# Patient Record
Sex: FEMALE | Race: WHITE | HISPANIC OR LATINO | Employment: FULL TIME | URBAN - METROPOLITAN AREA
[De-identification: names, ages, dates, MRNs, and addresses within clinical notes are randomized per-mention and may not be internally consistent; named-entity substitution may affect disease eponyms.]

---

## 2023-05-18 PROBLEM — H11.33 SUBCONJUNCTIVAL HEMORRHAGE OF BOTH EYES: Status: ACTIVE | Noted: 2023-05-18

## 2023-05-18 PROBLEM — C34.90 MALIGNANT NEOPLASM OF LUNG (MULTI): Status: ACTIVE | Noted: 2023-05-18

## 2023-05-18 PROBLEM — E55.9 VITAMIN D DEFICIENCY: Status: ACTIVE | Noted: 2023-05-18

## 2023-05-18 PROBLEM — H00.011 HORDEOLUM EXTERNUM OF RIGHT UPPER EYELID: Status: ACTIVE | Noted: 2023-05-18

## 2023-05-18 PROBLEM — J38.3 VOCAL FOLD SCAR: Status: ACTIVE | Noted: 2023-05-18

## 2023-05-18 PROBLEM — R68.89 FLU-LIKE SYMPTOMS: Status: ACTIVE | Noted: 2023-05-18

## 2023-05-18 PROBLEM — K21.9 GERD (GASTROESOPHAGEAL REFLUX DISEASE): Status: ACTIVE | Noted: 2023-05-18

## 2023-05-18 PROBLEM — R53.83 FATIGUE: Status: ACTIVE | Noted: 2023-05-18

## 2023-05-18 PROBLEM — R49.0 VOICE HOARSENESS: Status: ACTIVE | Noted: 2023-05-18

## 2023-05-18 PROBLEM — D14.1 PAPILLOMA OF LARYNX: Status: ACTIVE | Noted: 2023-05-18

## 2023-05-18 PROBLEM — B97.7 HPV (HUMAN PAPILLOMA VIRUS) INFECTION: Status: ACTIVE | Noted: 2023-05-18

## 2023-05-18 PROBLEM — H10.9 CONJUNCTIVITIS, BACTERIAL: Status: ACTIVE | Noted: 2023-05-18

## 2023-05-22 ENCOUNTER — OFFICE VISIT (OUTPATIENT)
Dept: PRIMARY CARE | Facility: CLINIC | Age: 28
End: 2023-05-22
Payer: COMMERCIAL

## 2023-05-22 VITALS
WEIGHT: 142 LBS | OXYGEN SATURATION: 96 % | HEART RATE: 73 BPM | SYSTOLIC BLOOD PRESSURE: 106 MMHG | BODY MASS INDEX: 24.24 KG/M2 | HEIGHT: 64 IN | DIASTOLIC BLOOD PRESSURE: 72 MMHG | TEMPERATURE: 96.7 F

## 2023-05-22 DIAGNOSIS — R22.31 NODULE OF FINGER, RIGHT: ICD-10-CM

## 2023-05-22 DIAGNOSIS — D14.2 PAPILLOMATOSIS OF TRACHEA: ICD-10-CM

## 2023-05-22 DIAGNOSIS — Z00.00 ROUTINE HEALTH MAINTENANCE: ICD-10-CM

## 2023-05-22 DIAGNOSIS — Z00.00 HEALTHCARE MAINTENANCE: ICD-10-CM

## 2023-05-22 DIAGNOSIS — Z86.69 HISTORY OF HORDEOLUM: ICD-10-CM

## 2023-05-22 DIAGNOSIS — C34.90 SQUAMOUS CELL CARCINOMA OF LUNG, UNSPECIFIED LATERALITY (MULTI): Primary | ICD-10-CM

## 2023-05-22 DIAGNOSIS — Z13.89 SCREENING FOR MULTIPLE CONDITIONS: ICD-10-CM

## 2023-05-22 PROBLEM — R49.0 DYSPHONIA: Status: ACTIVE | Noted: 2017-10-19

## 2023-05-22 PROBLEM — R53.81 MALAISE: Status: ACTIVE | Noted: 2017-03-19

## 2023-05-22 PROCEDURE — G0442 ANNUAL ALCOHOL SCREEN 15 MIN: HCPCS | Performed by: STUDENT IN AN ORGANIZED HEALTH CARE EDUCATION/TRAINING PROGRAM

## 2023-05-22 PROCEDURE — 1036F TOBACCO NON-USER: CPT | Performed by: STUDENT IN AN ORGANIZED HEALTH CARE EDUCATION/TRAINING PROGRAM

## 2023-05-22 PROCEDURE — 99395 PREV VISIT EST AGE 18-39: CPT | Performed by: STUDENT IN AN ORGANIZED HEALTH CARE EDUCATION/TRAINING PROGRAM

## 2023-05-22 PROCEDURE — 96127 BRIEF EMOTIONAL/BEHAV ASSMT: CPT | Performed by: STUDENT IN AN ORGANIZED HEALTH CARE EDUCATION/TRAINING PROGRAM

## 2023-05-22 ASSESSMENT — ENCOUNTER SYMPTOMS
NAUSEA: 0
UNEXPECTED WEIGHT CHANGE: 0
LIGHT-HEADEDNESS: 0
DIAPHORESIS: 0
VOMITING: 0
FEVER: 0
CHILLS: 0
DIZZINESS: 0
SHORTNESS OF BREATH: 0
DYSURIA: 0

## 2023-05-22 ASSESSMENT — PATIENT HEALTH QUESTIONNAIRE - PHQ9
1. LITTLE INTEREST OR PLEASURE IN DOING THINGS: NOT AT ALL
2. FEELING DOWN, DEPRESSED OR HOPELESS: NOT AT ALL
SUM OF ALL RESPONSES TO PHQ9 QUESTIONS 1 AND 2: 0

## 2023-05-22 NOTE — PROGRESS NOTES
"Subjective   Patient ID: Polina Porter is a 28 y.o. female who presents for Annual Exam.  She is here for CPE.    She has a bump on her right index finger. Started a few weeks ago, about the same size and hasn't changed. No draingage and not painful.  Hasn't had this before. Hasn't seen dermatology before.    Requesting referral to thoracic surgery to re establish care.    Reports getting recurrent styes. First getting styes about 2 years ago and gets at least 2-3 styes every winter. Doesn't wear makeup often. Doesn't wear contacts. Lasts 1-2 weeks and goes away.     OB/GYN: hasn't seen regularly.    Social History: Works at [employer] and at home with  and 3 kids, feels safe at home. Currently breastfeeding.           Review of Systems   Constitutional:  Negative for chills, diaphoresis, fever and unexpected weight change.   HENT:  Negative for hearing loss.    Eyes:  Negative for visual disturbance.   Respiratory:  Negative for shortness of breath.    Cardiovascular:  Negative for chest pain.   Gastrointestinal:  Negative for nausea and vomiting.   Genitourinary:  Negative for dysuria.   Skin:  Negative for rash.   Neurological:  Negative for dizziness and light-headedness.       /72   Pulse 73   Temp 35.9 °C (96.7 °F)   Ht 1.613 m (5' 3.5\")   Wt 64.4 kg (142 lb)   SpO2 96%   BMI 24.76 kg/m²     Objective   Physical Exam  Vitals reviewed.   HENT:      Head: Normocephalic.      Right Ear: Tympanic membrane, ear canal and external ear normal. There is no impacted cerumen.      Left Ear: Tympanic membrane, ear canal and external ear normal. There is no impacted cerumen.      Mouth/Throat:      Mouth: Mucous membranes are moist.      Pharynx: Oropharynx is clear. No oropharyngeal exudate or posterior oropharyngeal erythema.   Cardiovascular:      Rate and Rhythm: Normal rate and regular rhythm.   Pulmonary:      Effort: Pulmonary effort is normal. No respiratory distress.      Breath sounds: Normal " breath sounds.   Abdominal:      General: Bowel sounds are normal. There is no distension.      Palpations: Abdomen is soft.      Tenderness: There is no abdominal tenderness. There is no guarding or rebound.   Musculoskeletal:         General: No deformity.      Cervical back: Neck supple. No tenderness.      Comments: Right index interphalangeal thickening about 2mm in diameter and 0.7cm long   Lymphadenopathy:      Cervical: No cervical adenopathy.   Skin:     Coloration: Skin is not jaundiced.   Neurological:      Mental Status: She is alert.   Psychiatric:         Mood and Affect: Mood normal.         Behavior: Behavior normal.         Assessment/Plan   Problem List Items Addressed This Visit          Respiratory    Papillomatosis of trachea     Continue f/u with ENT         Squamous cell lung cancer (CMS/HCC) - Primary     Advised to reach out to Dr. Fuchs to re establish care and help determine frequency of chest imaging- last saw 2020. Referral placed in case needed.         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 Referred to ortho hand to establish care         Relevant Orders    Referral to Orthopaedic Surgery       Other    Routine health maintenance    Relevant Orders    CBC    Comprehensive Metabolic Panel    Lipid Panel    TSH with reflex to Free T4 if abnormal    Follow Up In [facility] - PCP    Healthcare maintenance     Health Maintenance  -Pap smear: Up to date. Referred to gyn to re establish care.  -Vaccinations: Recommended covid bivalent booster, UTD TDAP, recommended flu vax after labor day  -Mammogram: Age 40  -Colonoscopy: Age 47  -Lung Cancer Screening: Not indicated  -DEXA: Age 67    CPE completed.  Advised to keep a heart healthy, low fat  diet with fruits and veggies like Mediterranean diet.  Advised on the importance of exercise and maintaining 150 minutes of exercise  per week (30 minutes per day 5 days a week).  Advised on regular eye and dental visits.  Discussed age appropriate cancer screening, immunizations and recommendations given.  Discussed avoiding illicit drugs and tobacco. Advised on appropriate use of alcohol.  Advised to wear seat belt.  Routine labs ordered  F/u 1 year for CPE or sooner PRN         Relevant Orders    Referral to Gynecology    History of hordeolum     Advised to reach out to eye doctor regarding chronic styes. Can refer to optho if needed. Advised good face hygiene and minimal makeup in interim.          Other Visit Diagnoses       Screening for multiple conditions [Z13.89 (ICD-10-CM)]

## 2023-05-23 NOTE — ASSESSMENT & PLAN NOTE
Advised to reach out to Dr. Fuchs to re establish care and help determine frequency of chest imaging- last saw 2020. Referral placed in case needed.

## 2023-05-23 NOTE — ASSESSMENT & PLAN NOTE
Advised to reach out to eye doctor regarding chronic styes. Can refer to optho if needed. Advised good face hygiene and minimal makeup in interim.

## 2023-05-23 NOTE — ASSESSMENT & PLAN NOTE
Health Maintenance  -Pap smear: Up to date. Referred to gyn to re establish care.  -Vaccinations: Recommended covid bivalent booster, UTD TDAP, recommended flu vax after labor day  -Mammogram: Age 42  -Colonoscopy: Age 47  -Lung Cancer Screening: Not indicated  -DEXA: Age 67    CPE completed.  Advised to keep a heart healthy, low fat  diet with fruits and veggies like Mediterranean diet.  Advised on the importance of exercise and maintaining 150 minutes of exercise per week (30 minutes per day 5 days a week).  Advised on regular eye and dental visits.  Discussed age appropriate cancer screening, immunizations and recommendations given.  Discussed avoiding illicit drugs and tobacco. Advised on appropriate use of alcohol.  Advised to wear seat belt.  Routine labs ordered  F/u 1 year for CPE or sooner PRN

## 2023-05-27 ENCOUNTER — LAB (OUTPATIENT)
Dept: LAB | Facility: LAB | Age: 28
End: 2023-05-27
Payer: COMMERCIAL

## 2023-05-27 DIAGNOSIS — Z00.00 ROUTINE HEALTH MAINTENANCE: ICD-10-CM

## 2023-05-27 LAB
ALANINE AMINOTRANSFERASE (SGPT) (U/L) IN SER/PLAS: 14 U/L (ref 7–45)
ALBUMIN (G/DL) IN SER/PLAS: 4.7 G/DL (ref 3.4–5)
ALKALINE PHOSPHATASE (U/L) IN SER/PLAS: 72 U/L (ref 33–110)
ANION GAP IN SER/PLAS: 12 MMOL/L (ref 10–20)
ASPARTATE AMINOTRANSFERASE (SGOT) (U/L) IN SER/PLAS: 14 U/L (ref 9–39)
BILIRUBIN TOTAL (MG/DL) IN SER/PLAS: 1 MG/DL (ref 0–1.2)
CALCIUM (MG/DL) IN SER/PLAS: 9.6 MG/DL (ref 8.6–10.3)
CARBON DIOXIDE, TOTAL (MMOL/L) IN SER/PLAS: 30 MMOL/L (ref 21–32)
CHLORIDE (MMOL/L) IN SER/PLAS: 104 MMOL/L (ref 98–107)
CHOLESTEROL (MG/DL) IN SER/PLAS: 135 MG/DL (ref 0–199)
CHOLESTEROL IN HDL (MG/DL) IN SER/PLAS: 54 MG/DL
CHOLESTEROL/HDL RATIO: 2.5
CREATININE (MG/DL) IN SER/PLAS: 0.66 MG/DL (ref 0.5–1.05)
ERYTHROCYTE DISTRIBUTION WIDTH (RATIO) BY AUTOMATED COUNT: 12.5 % (ref 11.5–14.5)
ERYTHROCYTE MEAN CORPUSCULAR HEMOGLOBIN CONCENTRATION (G/DL) BY AUTOMATED: 33.1 G/DL (ref 32–36)
ERYTHROCYTE MEAN CORPUSCULAR VOLUME (FL) BY AUTOMATED COUNT: 90 FL (ref 80–100)
ERYTHROCYTES (10*6/UL) IN BLOOD BY AUTOMATED COUNT: 4.72 X10E12/L (ref 4–5.2)
GFR FEMALE: >90 ML/MIN/1.73M2
GLUCOSE (MG/DL) IN SER/PLAS: 88 MG/DL (ref 74–99)
HEMATOCRIT (%) IN BLOOD BY AUTOMATED COUNT: 42.3 % (ref 36–46)
HEMOGLOBIN (G/DL) IN BLOOD: 14 G/DL (ref 12–16)
LDL: 64 MG/DL (ref 0–99)
LEUKOCYTES (10*3/UL) IN BLOOD BY AUTOMATED COUNT: 5 X10E9/L (ref 4.4–11.3)
PLATELETS (10*3/UL) IN BLOOD AUTOMATED COUNT: 204 X10E9/L (ref 150–450)
POTASSIUM (MMOL/L) IN SER/PLAS: 4.3 MMOL/L (ref 3.5–5.3)
PROTEIN TOTAL: 7 G/DL (ref 6.4–8.2)
SODIUM (MMOL/L) IN SER/PLAS: 142 MMOL/L (ref 136–145)
THYROTROPIN (MIU/L) IN SER/PLAS BY DETECTION LIMIT <= 0.05 MIU/L: 0.94 MIU/L (ref 0.44–3.98)
TRIGLYCERIDE (MG/DL) IN SER/PLAS: 83 MG/DL (ref 0–149)
UREA NITROGEN (MG/DL) IN SER/PLAS: 16 MG/DL (ref 6–23)
VLDL: 17 MG/DL (ref 0–40)

## 2023-05-27 PROCEDURE — 80053 COMPREHEN METABOLIC PANEL: CPT

## 2023-05-27 PROCEDURE — 36415 COLL VENOUS BLD VENIPUNCTURE: CPT

## 2023-05-27 PROCEDURE — 84443 ASSAY THYROID STIM HORMONE: CPT

## 2023-05-27 PROCEDURE — 80061 LIPID PANEL: CPT

## 2023-05-27 PROCEDURE — 85027 COMPLETE CBC AUTOMATED: CPT

## 2023-08-11 ENCOUNTER — HOSPITAL ENCOUNTER (OUTPATIENT)
Dept: DATA CONVERSION | Facility: HOSPITAL | Age: 28
End: 2023-08-11
Attending: OTOLARYNGOLOGY | Admitting: OTOLARYNGOLOGY
Payer: COMMERCIAL

## 2023-08-11 DIAGNOSIS — R49.9 UNSPECIFIED VOICE AND RESONANCE DISORDER: ICD-10-CM

## 2023-08-11 DIAGNOSIS — J38.3 OTHER DISEASES OF VOCAL CORDS: ICD-10-CM

## 2023-08-11 DIAGNOSIS — D14.1 BENIGN NEOPLASM OF LARYNX: ICD-10-CM

## 2023-09-29 VITALS — BODY MASS INDEX: 25.52 KG/M2 | WEIGHT: 138.67 LBS | HEIGHT: 62 IN

## 2023-09-30 NOTE — H&P
History of Present Illness:   Pregnant/Lactating:  ·  Are You Pregnant no   ·  Are You Currently Breastfeeding no     History Present Illness:  Reason for surgery: recurrent papillomatosis   HPI:    This is a 28 year old female with a history of recurrent respiratory papillomas     Allergies:        Allergies:  ·  No Known Allergies :     Home Medication Review:   Home Medications Reviewed: yes     Impression/Procedure:   ·  Impression and Planned Procedure: MDL, bronch, KTP laser, possible biopsy, possible avastin injection       ERAS (Enhanced Recovery After Surgery):  ·  ERAS Patient: no       Physical Exam by System:    Respiratory/Thorax: chest rise symmetric   Cardiovascular: RRR     Consent:   COVID-19 Consent:  ·  COVID-19 Risk Consent Surgeon has reviewed key risks related to the risk of gissel COVID-19 and if they contract COVID-19 what the risks are.     Attestation:   Note Completion:  I am a:  Resident/Fellow   Attending Attestation I saw and evaluated the patient.  I personally obtained the key and critical portions of the history and physical exam or was physically present for key and  critical portions performed by the resident/fellow. I reviewed the resident/fellow?s documentation and discussed the patient with the resident/fellow.  I agree with the resident/fellow?s medical decision making as documented in the note.     I personally evaluated the patient on 11-Aug-2023   Comments/ Additional Findings    Patient and I discussed the risks, benefits and alternatives to surgery and all questions answered.  Cleared to OR.          Electronic Signatures:  Dakota Amos)  (Signed 11-Aug-2023 06:50)   Authored: Note Completion   Co-Signer: History of Present Illness, Allergies, Home Medication Review, Impression/Procedure, ERAS, Physical Exam, Consent, Note Completion  Alexandrea Mccann (Resident))  (Signed 11-Aug-2023 05:45)   Authored: History of Present Illness, Allergies, Home   Medication Review, Impression/Procedure, ERAS, Physical Exam, Consent, Note Completion      Last Updated: 11-Aug-2023 06:50 by Dakota Amos)

## 2023-10-01 NOTE — OP NOTE
PROCEDURE DETAILS    Preoperative Diagnosis:  1. Recurrent respiratory papillomatosis  2. Voice change  Postoperative Diagnosis:  1. Recurrent respiratory papillomatosis  2. Voice change  Surgeon: MD Dandre  Resident/Fellow/Other Assistant: MD Ramy    Procedure:  1. Microdirect laryngoscopy, diagnostic  2. Rigid bronchoscopy  3. Microdirect laryngoscopy with KTP laser of supraglottis, glottis, subglottis and trachea  4. Microdirect laryngoscopy with steroid injection  Anesthesia: GETA - 5-0 laser ETT  Estimated Blood Loss: 1cc  Findings: Papillomas of the right TVC, ventricle, false cord, and subglottis/trachea (posterior and right)  Specimens(s) Collected: no,           Operative Report:   INDICATIONS:    This is a 28 year old female with a history of recurrent respiratory papillomatosis who has undergone numerous procedures (>100) in the past. She returned with recurrent hoarseness and was found to have papilloma burden along the vocal cords.     We discussed the risks and benefits to include, but not be limited to, bleeding; infection; damage to surrounding structures including the teeth, gums, lips, tongue; laser fire/burn; taste change; medical complications; and risks of anesthesia.  Their  questions are answered and they sign written consent.    DESCRIPTION OF PROCEDURE:    The patient was taken to the operating room and placed in the supine position on the operating room table.  A time out procedure was performed confirming patient and site. Following satisfactory induction of general anesthesia, The patient was intubated  with a 5.0 laser endotracheal tube by anesthesia and turned 90 degrees away from anesthesia.     The teeth were covered with a moldable mouth guard. The dedo laryngoscope was inserted and laryngoscopy was performed revealing normal  tissue without papilloma of all subsites of the oral cavity, oropharynx and hypopharynx. We then suspended using the  lewy arm exposing the  glottis. Using a 30 degree bronchoscope, the glottis was further visualized, photo documentation was obtained, revealing papillomas of the right true vocal cord extending onto the ventricle and false cord on that side. The microscope  was then brought in, appropriate eye and face protections were applied to the patient and the operating room staff.  Laser time-out was performed by Dr. Amos. Oxygen was off when lasering occurred.      A cottonoid was placed below the vocal cords to protect the balloon from laser. The KTP laser was utilized at settings of 30watts/15ms/2pps, first addressing the vocal cord papillomas and then moving laterally into the ventricle and onto the false cord.  Once adequate ablation was achieved, we used the bronchoscope to visualize the subglottis, revealing papillomas over the posterior and right wall of the trachea. Using intermittent apnea, the KTP laser was used again in this region under endoscopic guidance  to treat the papillomas. Next, a total of 1cc of 10mg/mL decadron was injected into the treated regions of the subglottis, glottis, and supraglottis. The dedo and mouth guard were removed.     The patient tolerated this well.  The patient was then turned back to Anesthesia for extubation and returned to the recovery room in satisfactory  condition.  Sponge, needle, instrument counts were reported as correct.  Estimated blood loss was minimal.  I was present and participated in all aspects of procedure.                        Attestation:   Note Completion:  Attending Attestation I was present for the entire procedure    I am a: Resident/Fellow         Electronic Signatures:  Dakota Amos)  (Signed 11-Aug-2023 13:28)   Authored: Post-Operative Note, Chart Review, Note Completion   Co-Signer: Post-Operative Note, Chart Review, Note Completion  Alexandrea Mccann (Resident))  (Signed 11-Aug-2023 11:19)   Authored: Post-Operative Note, Chart Review, Note Completion      Last  Updated: 11-Aug-2023 13:28 by Dakota Amos)

## 2023-11-27 ENCOUNTER — OFFICE VISIT (OUTPATIENT)
Dept: OTOLARYNGOLOGY | Facility: HOSPITAL | Age: 28
End: 2023-11-27
Payer: COMMERCIAL

## 2023-11-27 VITALS — HEIGHT: 63 IN | TEMPERATURE: 97.5 F | WEIGHT: 135.4 LBS | BODY MASS INDEX: 23.99 KG/M2

## 2023-11-27 DIAGNOSIS — J38.3 VOCAL FOLD SCAR: Primary | ICD-10-CM

## 2023-11-27 DIAGNOSIS — D14.1 PAPILLOMA OF LARYNX: ICD-10-CM

## 2023-11-27 DIAGNOSIS — R49.0 DYSPHONIA: ICD-10-CM

## 2023-11-27 DIAGNOSIS — D14.2 PAPILLOMATOSIS OF TRACHEA: ICD-10-CM

## 2023-11-27 DIAGNOSIS — R49.0 VOICE HOARSENESS: ICD-10-CM

## 2023-11-27 DIAGNOSIS — B97.7 HPV (HUMAN PAPILLOMA VIRUS) INFECTION: ICD-10-CM

## 2023-11-27 PROCEDURE — 99214 OFFICE O/P EST MOD 30 MIN: CPT | Performed by: OTOLARYNGOLOGY

## 2023-11-27 PROCEDURE — 31575 DIAGNOSTIC LARYNGOSCOPY: CPT | Performed by: OTOLARYNGOLOGY

## 2023-11-27 PROCEDURE — 1036F TOBACCO NON-USER: CPT | Performed by: OTOLARYNGOLOGY

## 2023-11-27 ASSESSMENT — PATIENT HEALTH QUESTIONNAIRE - PHQ9
2. FEELING DOWN, DEPRESSED OR HOPELESS: NOT AT ALL
1. LITTLE INTEREST OR PLEASURE IN DOING THINGS: NOT AT ALL
SUM OF ALL RESPONSES TO PHQ9 QUESTIONS 1 & 2: 0

## 2023-11-27 NOTE — PROGRESS NOTES
ASSESSMENT AND PLAN:   Polina Porter is a 28 y.o. female with a history of RRP.      Today's examination to include flexible laryngoscopy demonstrates a small focus ~2 x 2 mm in the anterior commissure on th left. There is a scar band near this location. Overall, she appears good without papillomas anywhere else. She has continued healing in the subglottis      I would like to see the patient back in 3 months. She has previously Indol 3 carbinol which she is interested in trying.        Reason For Consult  No chief complaint on file.       HISTORY OF PRESENT ILLNESS:  Polina Porter is a 28 y.o. female presenting for a follow up visit with me for Post op 8/11/23 for RRP treated with KTP laser.I treated the  right TVC, ventricle, false cord, and subglottis/trachea (posterior and right)    The patient reports that she is doing well.  She is breathing well and no changes to her voice.       Prior History:   Last seen on 06/28/2023  with a history of recurrent papillomas. There is a small portion of papillomas on the right mid cord on the superior surface. This is affecting vibratory wave. She has good closure. She will be scheduled for a procedure at a time that is convenient for her at any of our facilities.        Past Medical History  She has a past medical history of 12 weeks gestation of pregnancy (12/17/2019), 20 weeks gestation of pregnancy (03/03/2020), Encounter for screening for diabetes mellitus (10/26/2021), Encounter for supervision of normal pregnancy, unspecified, unspecified trimester (07/27/2021), Encounter for supervision of normal pregnancy, unspecified, unspecified trimester (04/09/2020), Other conditions influencing health status (05/28/2021), Other diseases of vocal cords, and Unspecified pre-eclampsia, third trimester (12/17/2019). Surgical History  She has a past surgical history that includes Other surgical history (10/01/2019); Other surgical history (10/01/2019); Other surgical history  (10/28/2019); and Other surgical history (10/28/2019).   Social History  She reports that she has never smoked. She has never been exposed to tobacco smoke. She has never used smokeless tobacco. She reports that she does not drink alcohol and does not use drugs. Allergies  Patient has no known allergies.     Family History  No family history on file.    Review of Systems  All 10 systems were reviewed and negative except for above.      Last Recorded Vitals  There were no vitals taken for this visit.    Physical Exam  ENT Physical Exam  Constitutional  Appearance: patient appears well-developed and well-nourished,  Head and Face  Appearance: head appears normal and face appears normal;  Ear  Auricles: right auricle normal; left auricle normal;  Nose  External Nose: nares patent bilaterally;  Oral Cavity/Oropharynx  Lips: normal;  Neck  Neck: neck normal; neck palpation normal;  Respiratory  Inspection: no retractions visible;  Cardiovascular  Inspection: no peripheral edema present;  Neurovestibular  Mental Status: alert and oriented;  Psychiatric: mood normal;  Cranial Nerves: cranial nerves intact;          Procedures     Flexible Laryngoscopy w/ Videostroboscopy    VOICE AND SPEECH CHARACTERISTICS:  Normal spoken speech, no dysphonia, no roughness, no breathiness, no asthenia, (+) mild strain.    Intelligibility: normal.   Resonance: balanced.   Vocal Loudness: normal.   Breath Support: normal.    PROCEDURE:    Indications: voice change  Procedure Note    Post-operative Diagnosis: same    Anesthesia: Lidocaine 4% and Virgil-Synephrine 1/2%    Endoscopy Type:  Flexible Laryngoscopy    Procedure Details:    The patient was placed in the sitting position.  After topical anesthesia and decongestion, the 4 mm laryngoscope was passed.  The nasal cavities, nasopharynx, oropharynx, hypopharynx, and larynx were all examined.  Vocal cords were examined during respiration and phonation.  The following findings were  noted:    Condition:  Stable.  Patient tolerated procedure well.    Complications:  None  Patient is seated in the exam chair. After adequate topical anesthesia, I advance the flexible endoscope. The examination included evaluation of the amador, vallecula, base of tongue, pyriforms, post-cricoid area, larynx and immediate subglottis.  Findings : assessment of the nasopharynx, base of tongue/vallecula, pyriform sinuses, post-cricoid area and pharyngeal walls was without lesion or mass, pharyngeal wall contraction is normal and symmetric, and no pooling of secretions  erythema/hyperemia: 4 (complete)  Gross Arytenoid Movement: symmetric.  Arytenoid Height: normal.   Supraglottic Tension: lateral.  Additional Findings: Scarring from previous airway surgeries. Small area of papilloma on the anterior left commissure.         Time Spent  Prep time on day of patient encounter: 10 minutes  Time spent directly with patient, family or caregiver: 15 minutes  Additional Time Spent on Patient Care Activities: 5 minutes  Documentation Time: 10 minutes  Other Time Spent: 0 minutes  Total: 40 minutes       Scribe Attestation  By signing my name below, I, Flory Weeks , Scribe attest that this documentation has been prepared under the direction and in the presence of Dakota Amos MD.

## 2024-06-24 ENCOUNTER — OFFICE VISIT (OUTPATIENT)
Dept: OTOLARYNGOLOGY | Facility: HOSPITAL | Age: 29
End: 2024-06-24
Payer: COMMERCIAL

## 2024-06-24 VITALS — HEIGHT: 63 IN | WEIGHT: 132 LBS | BODY MASS INDEX: 23.39 KG/M2 | TEMPERATURE: 97.2 F

## 2024-06-24 DIAGNOSIS — B97.7 HPV (HUMAN PAPILLOMA VIRUS) INFECTION: Primary | ICD-10-CM

## 2024-06-24 DIAGNOSIS — R49.0 VOICE HOARSENESS: ICD-10-CM

## 2024-06-24 DIAGNOSIS — J38.3 VOCAL FOLD SCAR: ICD-10-CM

## 2024-06-24 DIAGNOSIS — D14.1 PAPILLOMA OF LARYNX: ICD-10-CM

## 2024-06-24 DIAGNOSIS — C34.00 MALIGNANT NEOPLASM OF HILUS OF LUNG, UNSPECIFIED LATERALITY (MULTI): ICD-10-CM

## 2024-06-24 DIAGNOSIS — D14.2 PAPILLOMATOSIS OF TRACHEA: ICD-10-CM

## 2024-06-24 PROCEDURE — 99214 OFFICE O/P EST MOD 30 MIN: CPT | Performed by: OTOLARYNGOLOGY

## 2024-06-24 PROCEDURE — 31579 LARYNGOSCOPY TELESCOPIC: CPT | Performed by: OTOLARYNGOLOGY

## 2024-06-24 ASSESSMENT — PATIENT HEALTH QUESTIONNAIRE - PHQ9
2. FEELING DOWN, DEPRESSED OR HOPELESS: NOT AT ALL
SUM OF ALL RESPONSES TO PHQ9 QUESTIONS 1 & 2: 0
1. LITTLE INTEREST OR PLEASURE IN DOING THINGS: NOT AT ALL

## 2024-06-24 NOTE — PROGRESS NOTES
ASSESSMENT AND PLAN:   Polina Porter is a 29 y.o. female with a history of RRP.      Patient has a scar band on the right TVC extending to the anterior commissure where small papiloma exists and some papiloma in left ventrical. We discussed observation and surgery. The patient would like to schedule surgery for November and be seen for follow-up in October. I feel this is reasonable considering her typical disease progression. We discussed an IO procedure but the patient does not want this. She has tried this in the past.          Reason For Consult  Chief Complaint   Patient presents with    Follow-up     Vocal cord review      Post op 8/11/23 for RRP    2 x 2 mm in the anterior commissure on th left. There is a scar band near this location on last visit 11/27/23    Treatment for :  right TVC, ventricle, false cord, and subglottis/trachea (posterior and right)    HISTORY OF PRESENT ILLNESS:  Polina Porter is a 29 y.o. female presenting for a follow up visit with me for RRP.      The patient reports her voice is adequate. She notes some small vocal changes.    Patient was uncomfortable with the procedure being completed in the office.     Prior History:   Last seen 11/27/23 Assessment and Plan:  Polina Porter is a 28 y.o. female with a history of RRP.       Today's examination to include flexible laryngoscopy demonstrates a small focus ~2 x 2 mm in the anterior commissure on th left. There is a scar band near this location. Overall, she appears good without papillomas anywhere else. She has continued healing in the subglottis    I would like to see the patient back in 3 months. She has previously Indol 3 carbinol which she is interested in trying.      Past Medical History  She has a past medical history of 12 weeks gestation of pregnancy ([facility]) (12/17/2019), 20 weeks gestation of pregnancy ([facility]) (03/03/2020), Encounter for screening for diabetes mellitus (10/26/2021), Encounter for supervision of normal  "pregnancy, unspecified, unspecified trimester ([facility]) (07/27/2021), Encounter for supervision of normal pregnancy, unspecified, unspecified trimester ([facility]) (04/09/2020), Other conditions influencing health status (05/28/2021), Other diseases of vocal cords, and Unspecified pre-eclampsia, third trimester ([facility]) (12/17/2019). Surgical History  She has a past surgical history that includes Other surgical history (10/01/2019); Other surgical history (10/01/2019); Other surgical history (10/28/2019); and Other surgical history (10/28/2019).   Social History  She reports that she has never smoked. She has never been exposed to tobacco smoke. She has never used smokeless tobacco. She reports that she does not drink alcohol and does not use drugs. Allergies  Pollen extracts     Family History  No family history on file.    Review of Systems  All 10 systems were reviewed and negative except for above.      Last Recorded Vitals  Temperature 36.2 °C (97.2 °F), height 1.59 m (5' 2.6\"), weight 59.9 kg (132 lb).    Physical Exam  ENT Physical Exam   ENT Physical Exam  Constitutional  Appearance: patient appears well-developed and well-nourished,  Head and Face  Appearance: head appears normal and face appears normal;  Ear  Auricles: right auricle normal; left auricle normal;  Nose  External Nose: nares patent bilaterally;  Oral Cavity/Oropharynx  Lips: normal;  Neck  Neck: neck normal; neck palpation normal;  Respiratory  Inspection: no retractions visible;  Cardiovascular  Inspection: no peripheral edema present;  Neurovestibular  Mental Status: alert and oriented;  Psychiatric: mood normal;  Cranial Nerves: cranial nerves intact;     Relevant Results       Patient Reported Outcome Measures         Radiology, Laboratory and Pathology  No results found.      Procedures     Flexible Laryngoscopy w/ Videostroboscopy    VOICE AND SPEECH CHARACTERISTICS:  Normal spoken speech, (+) mild dysphonia, no roughness, (+) mild " breathiness, no asthenia, (+) mild strain.    Intelligibility: normal.   Resonance: balanced.   Vocal Loudness: normal.   Breath Support: normal.    PROCEDURE:    Indications:  RRP  PROCEDURE NOTE: FLEXIBLE LARYNGOSCOPY WITH STROBOSCOPY  I recommended a flexible laryngoscopy with stroboscopy based on PE findings, and/or concern for mucosal wave details based upon history and/or for issues associated with hyperreflexic gag on mirror exam concerning for pathology. Risks, benefits, and alternatives were explained. The patient wishes to proceed and gives verbal consent.   Patient is seated in the exam chair. After adequate topical anesthesia, I advance the flexible endoscope. The examination included evaluation of the amador, vallecula, base of tongue, pyriforms, post-cricoid area, larynx and immediate subglottis.    Reflux Finding Score  Subglottic edema: Absent  Thick Mucus: Absent  Granuloma: Absent  Ventricular Obliteration: Partial (2)  Erythema/Hyperemia: Absent  IA Thickening: Mild (1)  TVC Edema: moderate  Diffuse Laryngitis: Absent    Gross Arytenoid Movement: symmetric.  Arytenoid Height: right low and left low.   Supraglottic Tension: lateral.  Symmetry: asymmetry.   Amplitude: reduced: right.  Phase Closure: in-phase.  Mucosal Wave Lateral Excursion/Secondary Wave: Bilateral Vocal Cord: moderate restriction - Wave moved up to ¼ the width of the vocal fold.  Periodicity: aperiodic.  Closure: closed.    Time Spent  Prep time on day of patient encounter: 10 minutes  Time spent directly with patient, family or caregiver: 15 minutes  Additional Time Spent on Patient Care Activities/Discussion with SLP re care plan: 5 minutes  Documentation Time: 10 minutes  Other Time Spent: 0 minutes  Total: 40 minutes     Scribe Attestation  By signing my name below, Lilibeth MILTON , Scribe   attest that this documentation has been prepared under the direction and in the presence of Dakota Amos MD.

## 2024-06-26 ENCOUNTER — APPOINTMENT (OUTPATIENT)
Dept: SURGERY | Facility: HOSPITAL | Age: 29
End: 2024-06-26
Payer: COMMERCIAL

## 2024-06-26 ENCOUNTER — APPOINTMENT (OUTPATIENT)
Dept: RADIOLOGY | Facility: HOSPITAL | Age: 29
End: 2024-06-26
Payer: COMMERCIAL

## 2024-06-26 DIAGNOSIS — C34.11 MALIGNANT NEOPLASM OF UPPER LOBE OF RIGHT LUNG (MULTI): Primary | ICD-10-CM

## 2024-07-31 ENCOUNTER — HOSPITAL ENCOUNTER (OUTPATIENT)
Dept: RADIOLOGY | Facility: HOSPITAL | Age: 29
Discharge: HOME | End: 2024-07-31
Payer: COMMERCIAL

## 2024-07-31 ENCOUNTER — OFFICE VISIT (OUTPATIENT)
Dept: SURGERY | Facility: HOSPITAL | Age: 29
End: 2024-07-31
Payer: COMMERCIAL

## 2024-07-31 VITALS
WEIGHT: 131.6 LBS | RESPIRATION RATE: 18 BRPM | SYSTOLIC BLOOD PRESSURE: 99 MMHG | BODY MASS INDEX: 23.61 KG/M2 | OXYGEN SATURATION: 98 % | TEMPERATURE: 96.1 F | DIASTOLIC BLOOD PRESSURE: 57 MMHG | HEART RATE: 75 BPM

## 2024-07-31 DIAGNOSIS — C34.90 MALIGNANT NEOPLASM OF LUNG, UNSPECIFIED LATERALITY, UNSPECIFIED PART OF LUNG (MULTI): ICD-10-CM

## 2024-07-31 DIAGNOSIS — C34.11 MALIGNANT NEOPLASM OF UPPER LOBE OF RIGHT LUNG (MULTI): ICD-10-CM

## 2024-07-31 PROCEDURE — 71250 CT THORAX DX C-: CPT

## 2024-07-31 PROCEDURE — 71250 CT THORAX DX C-: CPT | Performed by: RADIOLOGY

## 2024-07-31 PROCEDURE — 99214 OFFICE O/P EST MOD 30 MIN: CPT | Performed by: THORACIC SURGERY (CARDIOTHORACIC VASCULAR SURGERY)

## 2024-07-31 ASSESSMENT — PAIN SCALES - GENERAL: PAINLEVEL: 0-NO PAIN

## 2024-07-31 NOTE — PROGRESS NOTES
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 She developed bilateral lung cancers. In August 2016 showed underwent a right upper lobectomy and middle lobe lateral segmentectomy for a T1bN0 squamous cell cancer. In May 2017 showed underwent a left superior segmentectomy for aT1bN0 squamous cell cancer. She also has HPV irritation in her upper airway and is seen ENT for periodic endoscopies and ablations. She is otherwise well and has no symptoms .Since the last visit there have been no changes to the past medical history, past surgical history, social history, family history, or review of systems.     I viewed the current CT scan and compared it to the prior.  There are no new lung nodules or adenopathy.  There is no evidence of recurrence.    Polina is doing well and has no evidence of recurrence.  I will see her back in 1 year with a CAT scan.

## 2024-10-28 ENCOUNTER — OFFICE VISIT (OUTPATIENT)
Dept: OTOLARYNGOLOGY | Facility: HOSPITAL | Age: 29
End: 2024-10-28
Payer: COMMERCIAL

## 2024-10-28 VITALS — WEIGHT: 133 LBS | BODY MASS INDEX: 24.48 KG/M2 | HEIGHT: 62 IN | TEMPERATURE: 96.8 F

## 2024-10-28 DIAGNOSIS — R49.0 MUSCLE TENSION DYSPHONIA: ICD-10-CM

## 2024-10-28 DIAGNOSIS — R49.8 OTHER VOICE AND RESONANCE DISORDERS: ICD-10-CM

## 2024-10-28 DIAGNOSIS — R49.0 VOICE HOARSENESS: ICD-10-CM

## 2024-10-28 DIAGNOSIS — K43.9 HERNIA OF ABDOMINAL WALL: ICD-10-CM

## 2024-10-28 DIAGNOSIS — Z78.9 RECURRENT RESPIRATORY PAPILLOMATOSIS: Primary | ICD-10-CM

## 2024-10-28 PROCEDURE — 99214 OFFICE O/P EST MOD 30 MIN: CPT | Mod: 25 | Performed by: OTOLARYNGOLOGY

## 2024-10-28 PROCEDURE — 99214 OFFICE O/P EST MOD 30 MIN: CPT | Performed by: OTOLARYNGOLOGY

## 2024-10-28 PROCEDURE — 31579 LARYNGOSCOPY TELESCOPIC: CPT | Performed by: OTOLARYNGOLOGY

## 2024-10-28 PROCEDURE — G2211 COMPLEX E/M VISIT ADD ON: HCPCS | Performed by: OTOLARYNGOLOGY

## 2024-10-28 PROCEDURE — 3008F BODY MASS INDEX DOCD: CPT | Performed by: OTOLARYNGOLOGY

## 2024-10-28 ASSESSMENT — PAIN SCALES - GENERAL: PAINLEVEL_OUTOF10: 0-NO PAIN

## 2024-10-28 ASSESSMENT — PATIENT HEALTH QUESTIONNAIRE - PHQ9
SUM OF ALL RESPONSES TO PHQ9 QUESTIONS 1 & 2: 0
1. LITTLE INTEREST OR PLEASURE IN DOING THINGS: NOT AT ALL
2. FEELING DOWN, DEPRESSED OR HOPELESS: NOT AT ALL

## 2024-11-08 ENCOUNTER — HOSPITAL ENCOUNTER (OUTPATIENT)
Facility: HOSPITAL | Age: 29
Setting detail: OUTPATIENT SURGERY
Discharge: HOME | End: 2024-11-08
Attending: OTOLARYNGOLOGY | Admitting: OTOLARYNGOLOGY
Payer: COMMERCIAL

## 2024-11-08 ENCOUNTER — ANESTHESIA (OUTPATIENT)
Dept: OPERATING ROOM | Facility: HOSPITAL | Age: 29
End: 2024-11-08
Payer: COMMERCIAL

## 2024-11-08 ENCOUNTER — ANESTHESIA EVENT (OUTPATIENT)
Dept: OPERATING ROOM | Facility: HOSPITAL | Age: 29
End: 2024-11-08
Payer: COMMERCIAL

## 2024-11-08 VITALS
BODY MASS INDEX: 24.42 KG/M2 | HEART RATE: 63 BPM | WEIGHT: 132.72 LBS | HEIGHT: 62 IN | TEMPERATURE: 97 F | OXYGEN SATURATION: 96 % | DIASTOLIC BLOOD PRESSURE: 71 MMHG | RESPIRATION RATE: 14 BRPM | SYSTOLIC BLOOD PRESSURE: 103 MMHG

## 2024-11-08 DIAGNOSIS — J38.3 VOCAL FOLD SCAR: ICD-10-CM

## 2024-11-08 DIAGNOSIS — R49.0 DYSPHONIA: ICD-10-CM

## 2024-11-08 DIAGNOSIS — D14.2 PAPILLOMATOSIS OF TRACHEA: Primary | ICD-10-CM

## 2024-11-08 DIAGNOSIS — B97.7 HPV (HUMAN PAPILLOMA VIRUS) INFECTION: ICD-10-CM

## 2024-11-08 PROCEDURE — 31536 LARYNGOSCOPY W/BX & OP SCOPE: CPT | Performed by: OTOLARYNGOLOGY

## 2024-11-08 PROCEDURE — 31571 LARYNGOSCOP W/VC INJ + SCOPE: CPT | Performed by: OTOLARYNGOLOGY

## 2024-11-08 PROCEDURE — 3700000001 HC GENERAL ANESTHESIA TIME - INITIAL BASE CHARGE: Performed by: OTOLARYNGOLOGY

## 2024-11-08 PROCEDURE — 31541 LARYNSCOP W/TUMR EXC + SCOPE: CPT | Performed by: OTOLARYNGOLOGY

## 2024-11-08 PROCEDURE — 3600000002 HC OR TIME - INITIAL BASE CHARGE - PROCEDURE LEVEL TWO: Performed by: OTOLARYNGOLOGY

## 2024-11-08 PROCEDURE — 3700000002 HC GENERAL ANESTHESIA TIME - EACH INCREMENTAL 1 MINUTE: Performed by: OTOLARYNGOLOGY

## 2024-11-08 PROCEDURE — 7100000009 HC PHASE TWO TIME - INITIAL BASE CHARGE: Performed by: OTOLARYNGOLOGY

## 2024-11-08 PROCEDURE — 2720000007 HC OR 272 NO HCPCS: Performed by: OTOLARYNGOLOGY

## 2024-11-08 PROCEDURE — 2500000001 HC RX 250 WO HCPCS SELF ADMINISTERED DRUGS (ALT 637 FOR MEDICARE OP): Performed by: STUDENT IN AN ORGANIZED HEALTH CARE EDUCATION/TRAINING PROGRAM

## 2024-11-08 PROCEDURE — 2500000004 HC RX 250 GENERAL PHARMACY W/ HCPCS (ALT 636 FOR OP/ED): Performed by: OTOLARYNGOLOGY

## 2024-11-08 PROCEDURE — 7100000010 HC PHASE TWO TIME - EACH INCREMENTAL 1 MINUTE: Performed by: OTOLARYNGOLOGY

## 2024-11-08 PROCEDURE — 7100000001 HC RECOVERY ROOM TIME - INITIAL BASE CHARGE: Performed by: OTOLARYNGOLOGY

## 2024-11-08 PROCEDURE — 7100000002 HC RECOVERY ROOM TIME - EACH INCREMENTAL 1 MINUTE: Performed by: OTOLARYNGOLOGY

## 2024-11-08 PROCEDURE — A4649 SURGICAL SUPPLIES: HCPCS | Performed by: OTOLARYNGOLOGY

## 2024-11-08 PROCEDURE — 2500000005 HC RX 250 GENERAL PHARMACY W/O HCPCS: Performed by: OTOLARYNGOLOGY

## 2024-11-08 PROCEDURE — 2500000004 HC RX 250 GENERAL PHARMACY W/ HCPCS (ALT 636 FOR OP/ED): Performed by: ANESTHESIOLOGIST ASSISTANT

## 2024-11-08 PROCEDURE — 2500000004 HC RX 250 GENERAL PHARMACY W/ HCPCS (ALT 636 FOR OP/ED): Performed by: STUDENT IN AN ORGANIZED HEALTH CARE EDUCATION/TRAINING PROGRAM

## 2024-11-08 PROCEDURE — 3600000007 HC OR TIME - EACH INCREMENTAL 1 MINUTE - PROCEDURE LEVEL TWO: Performed by: OTOLARYNGOLOGY

## 2024-11-08 RX ORDER — DEXAMETHASONE SODIUM PHOSPHATE 10 MG/ML
INJECTION INTRAMUSCULAR; INTRAVENOUS AS NEEDED
Status: DISCONTINUED | OUTPATIENT
Start: 2024-11-08 | End: 2024-11-08 | Stop reason: HOSPADM

## 2024-11-08 RX ORDER — WATER 1 ML/ML
IRRIGANT IRRIGATION AS NEEDED
Status: DISCONTINUED | OUTPATIENT
Start: 2024-11-08 | End: 2024-11-08 | Stop reason: HOSPADM

## 2024-11-08 RX ORDER — ONDANSETRON HYDROCHLORIDE 2 MG/ML
4 INJECTION, SOLUTION INTRAVENOUS ONCE AS NEEDED
Status: DISCONTINUED | OUTPATIENT
Start: 2024-11-08 | End: 2024-11-08 | Stop reason: HOSPADM

## 2024-11-08 RX ORDER — LABETALOL HYDROCHLORIDE 5 MG/ML
5 INJECTION, SOLUTION INTRAVENOUS ONCE AS NEEDED
Status: DISCONTINUED | OUTPATIENT
Start: 2024-11-08 | End: 2024-11-08 | Stop reason: HOSPADM

## 2024-11-08 RX ORDER — ROCURONIUM BROMIDE 10 MG/ML
INJECTION, SOLUTION INTRAVENOUS AS NEEDED
Status: DISCONTINUED | OUTPATIENT
Start: 2024-11-08 | End: 2024-11-08

## 2024-11-08 RX ORDER — OXYCODONE HYDROCHLORIDE 5 MG/1
5 TABLET ORAL EVERY 4 HOURS PRN
Status: DISCONTINUED | OUTPATIENT
Start: 2024-11-08 | End: 2024-11-08 | Stop reason: HOSPADM

## 2024-11-08 RX ORDER — DIPHENHYDRAMINE HYDROCHLORIDE 50 MG/ML
12.5 INJECTION INTRAMUSCULAR; INTRAVENOUS ONCE AS NEEDED
Status: DISCONTINUED | OUTPATIENT
Start: 2024-11-08 | End: 2024-11-08 | Stop reason: HOSPADM

## 2024-11-08 RX ORDER — ACETAMINOPHEN 325 MG/1
975 TABLET ORAL ONCE
Status: COMPLETED | OUTPATIENT
Start: 2024-11-08 | End: 2024-11-08

## 2024-11-08 RX ORDER — DEXAMETHASONE SODIUM PHOSPHATE 10 MG/ML
INJECTION INTRAMUSCULAR; INTRAVENOUS AS NEEDED
Status: DISCONTINUED | OUTPATIENT
Start: 2024-11-08 | End: 2024-11-08

## 2024-11-08 RX ORDER — ACETAMINOPHEN 500 MG
1000 TABLET ORAL EVERY 6 HOURS PRN
Qty: 30 TABLET | Refills: 0 | Status: SHIPPED | OUTPATIENT
Start: 2024-11-08

## 2024-11-08 RX ORDER — ESMOLOL HYDROCHLORIDE 10 MG/ML
INJECTION INTRAVENOUS AS NEEDED
Status: DISCONTINUED | OUTPATIENT
Start: 2024-11-08 | End: 2024-11-08

## 2024-11-08 RX ORDER — MIDAZOLAM HYDROCHLORIDE 1 MG/ML
INJECTION INTRAMUSCULAR; INTRAVENOUS AS NEEDED
Status: DISCONTINUED | OUTPATIENT
Start: 2024-11-08 | End: 2024-11-08

## 2024-11-08 RX ORDER — PROPOFOL 10 MG/ML
INJECTION, EMULSION INTRAVENOUS AS NEEDED
Status: DISCONTINUED | OUTPATIENT
Start: 2024-11-08 | End: 2024-11-08

## 2024-11-08 RX ORDER — FENTANYL CITRATE 50 UG/ML
INJECTION, SOLUTION INTRAMUSCULAR; INTRAVENOUS AS NEEDED
Status: DISCONTINUED | OUTPATIENT
Start: 2024-11-08 | End: 2024-11-08

## 2024-11-08 RX ORDER — LIDOCAINE HYDROCHLORIDE 20 MG/ML
INJECTION, SOLUTION EPIDURAL; INFILTRATION; INTRACAUDAL; PERINEURAL AS NEEDED
Status: DISCONTINUED | OUTPATIENT
Start: 2024-11-08 | End: 2024-11-08

## 2024-11-08 RX ORDER — EPINEPHRINE 1 MG/ML
INJECTION INTRAMUSCULAR; INTRAVENOUS; SUBCUTANEOUS AS NEEDED
Status: DISCONTINUED | OUTPATIENT
Start: 2024-11-08 | End: 2024-11-08 | Stop reason: HOSPADM

## 2024-11-08 RX ORDER — LIDOCAINE HYDROCHLORIDE 10 MG/ML
0.1 INJECTION, SOLUTION EPIDURAL; INFILTRATION; INTRACAUDAL; PERINEURAL ONCE
Status: DISCONTINUED | OUTPATIENT
Start: 2024-11-08 | End: 2024-11-08 | Stop reason: HOSPADM

## 2024-11-08 RX ORDER — ONDANSETRON HYDROCHLORIDE 2 MG/ML
INJECTION, SOLUTION INTRAVENOUS AS NEEDED
Status: DISCONTINUED | OUTPATIENT
Start: 2024-11-08 | End: 2024-11-08

## 2024-11-08 ASSESSMENT — PAIN SCALES - GENERAL
PAINLEVEL_OUTOF10: 0 - NO PAIN
PAINLEVEL_OUTOF10: 7
PAINLEVEL_OUTOF10: 1
PAINLEVEL_OUTOF10: 7
PAINLEVEL_OUTOF10: 3

## 2024-11-08 ASSESSMENT — COLUMBIA-SUICIDE SEVERITY RATING SCALE - C-SSRS
6. HAVE YOU EVER DONE ANYTHING, STARTED TO DO ANYTHING, OR PREPARED TO DO ANYTHING TO END YOUR LIFE?: NO
1. IN THE PAST MONTH, HAVE YOU WISHED YOU WERE DEAD OR WISHED YOU COULD GO TO SLEEP AND NOT WAKE UP?: NO
2. HAVE YOU ACTUALLY HAD ANY THOUGHTS OF KILLING YOURSELF?: NO

## 2024-11-08 ASSESSMENT — ENCOUNTER SYMPTOMS
PSYCHIATRIC NEGATIVE: 1
FEVER: 0
CARDIOVASCULAR NEGATIVE: 1

## 2024-11-08 ASSESSMENT — PAIN - FUNCTIONAL ASSESSMENT
PAIN_FUNCTIONAL_ASSESSMENT: 0-10
PAIN_FUNCTIONAL_ASSESSMENT: UNABLE TO SELF-REPORT

## 2024-11-08 ASSESSMENT — PAIN SCALES - PAIN ASSESSMENT IN ADVANCED DEMENTIA (PAINAD)
TOTALSCORE: MEDICATION (SEE MAR)

## 2024-11-08 ASSESSMENT — PAIN DESCRIPTION - LOCATION
LOCATION: THROAT
LOCATION: THROAT

## 2024-11-08 NOTE — SIGNIFICANT EVENT
Patient arrived to [place] after procedure with Anesthesia and procedure RN, procedure discussed, plan reviewed, VSS

## 2024-11-08 NOTE — OP NOTE
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]     Procedures  Microlaryngoscopy; Bronchoscopy; KTP Laser; Biopsy; Injection  51542 - SD LARYNGOSCOPY W/WO TRACHEOSCOPY W/MICRO/TELESCOPE    SD LARYNGOSCOPY W/BIOPSY MICROSCOPE/TELESCOPE [08662]  SD BRNCHSC INCL FLUOR GDNCE DX W/CELL WASHG SPX [28976]  SD LARGSC W/NJX VOCAL CORD THER W/MICRO/TELESCOPE [35291]  SD LARGSC EXC COLLETTE&/STRPG CORDS/EPIGL MCRSCP/TLSCP [17039]  Surgeons      * Dakota Amos - Primary    Resident/Fellow/Other Assistant:  Surgeons and Role:  * No surgeons found with a matching role *    Staff:   Circulator: Merle Jesus Person: Steffany  Circulator: Clare    Anesthesia Staff: Anesthesiologist: Maik Myers MD  C-AA: BILLY Macias    Procedure Summary  Anesthesia: General  ASA: III  Estimated Blood Loss: <5 mL  Intra-op Medications: Administrations occurring from 0730 to 0900 on 24:  * No intraprocedure medications in log *           Anesthesia Record               Intraprocedure I/O Totals       None           Specimen: No specimens collected     Findings: RRP, Primary lesion at the anterior commissure near the left ventricle extending onto the left TVC, Scar of the right TVC creates slight height mismatch.   Did not perform lysis of the scar band.  All visible RRP absent at end of case.  Minor lip laceration (1 mm) occurred when replacing malpositioned ETT.     Indications: Polina Porter is an 29 y.o. 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 History of voice changes and TVC changes consistent with a benign vocal fold mass - RRP.  Reduced voice quality and increased effort/fatigue.  Discussed risks, benefits and alternatives and  patient wished to proceed with surgical excisional biopsy.   Informed consent was obtained and the patient was taken to the OR.     The patient was seen in the preoperative area. The risks, benefits, complications, treatment options, non-operative alternatives, expected recovery and outcomes were discussed with the patient. The possibilities of reaction to medication, pulmonary aspiration, injury to surrounding structures, bleeding, recurrent infection, the need for additional procedures, failure to diagnose a condition, and creating a complication requiring transfusion or operation were discussed with the patient. The patient concurred with the proposed plan, giving informed consent.  The site of surgery was properly noted/marked if necessary per policy. The patient has been actively warmed in preoperative area. Preoperative antibiotics are not indicated. Venous thrombosis prophylaxis have been ordered including bilateral sequential compression devices    Procedure in detail: The patient was seen and identified in the preoperative holding area and at that time further questions were answered. The patient was escorted to the operating suite, transferred to the operating table in a supine position. A huddle was taken, verifying the correct patient, surgical site, and procedure. The anesthesia team provided general anesthesia with a 5-0 laser safe endotracheal tube inserted. The patient was turned 90 degrees towards the ENT team.     Prior to the start of the case, a pre-incision pause was taken to again verify the correct patient, procedure and surgical site. In addition, a laser safety time out was performed prior to use of the laser.  Appropriate eye and face protections was applied to the patient and the operating room staff prior to laser use.  The oxygen level was also at or below 30-35% for the entirety of the laser portions of the case.      A dental guard was placed on the upper dentition.  The Dedo  Laryngoscope was introduced transorally along the right and left sides of the tongue.  No concerning masses or lesions were identified in the oral cavity, oropharynx, or hypopharynx. The glottis was exposed without anterior cricoid pressure to expose the anterior commissure.  The laryngoscope was secured on the mustard stand and a 12° telescope was utilized to inspect the airway. The above findings were visualized. Photodocumentation was obtained.      The microscope was moved in position, and utilized for a majority of the below procedure. Attention was first directed to the left true vocal fold.  An injection of 0.2 ml of preservative free saline was performed into the area just below the lesion.  This afforded improved visualization of the mucosal changes and mass effect.      Placement of 1:1000 epi pledget was performed to protect the airway below the lesion.     Next, the KTP laser was used to ablate the mass.  Cup forceps were used to remove the lesion.  At the conclusion of this, normal contour was appreciated.  Photodocumention was performed.   1 ml of decadron 10mg/ml was injected into this area.      The telescope used to inspect the undersurface of the vocal folds and the proximal trachea.  There were no concerning areas below the subglottis.  There were no lesions in the IA space.      This then concluded the operative portion of the procedure and all instruments were removed from the patient. Sponge, needle, instrument counts were reported as correct. The patient was then turned back to anesthesia for awakening and extubation. There were no complications. The patient was transported to the post-anesthesia care unit in stable condition.     Dr. Amos was present for and participated in all critical aspects of the procedure.     Complications:  None; patient tolerated the procedure well.    Disposition: PACU - hemodynamically stable.  Condition: stable   Attending Attestation: I performed the  procedure.    Dakota Amos  Phone Number: [phone]

## 2024-11-08 NOTE — ANESTHESIA PREPROCEDURE EVALUATION
Patient: Polina Porter    Procedure Information       Date/Time: 11/08/24 9488    Procedure: Microlaryngoscopy; Bronchoscopy; KTP Laser; Biopsy; Injection (Throat)    Location: [facility] A OR 01 / Virtual [facility] A OR    Surgeons: Dakota Amos MD            Relevant Problems   Pulmonary   (+) Malignant neoplasm of lung (Multi)   (+) Squamous cell lung cancer (Multi)      GI   (+) GERD (gastroesophageal reflux disease)      ID   (+) Conjunctivitis, bacterial   (+) HPV (human papilloma virus) infection       Clinical information reviewed:                   NPO Detail:  No data recorded     Physical Exam    Airway  Mallampati: II  TM distance: >3 FB  Neck ROM: full     Cardiovascular   Rhythm: regular  Rate: normal     Dental    Pulmonary   Breath sounds clear to auscultation     Abdominal            Anesthesia Plan    History of general anesthesia?: yes  History of complications of general anesthesia?: no    ASA 3     general     intravenous induction   Anesthetic plan and risks discussed with patient.    Plan discussed with CRNA.

## 2024-11-08 NOTE — DISCHARGE SUMMARY
Discharge Diagnosis  Papillomatosis of trachea    Issues Requiring Follow-Up  RRP    Test Results Pending At Discharge  Pending Labs       No current pending labs.            Hospital Course   Tolerated procedure well.  Minor lip laceration (1 mm) replacing misplaced ETT.  All RRP removed with KTP laser and cup forceps.  No specimen sent.   Injected steroid.      Planned DC home.     Pertinent Physical Exam At Time of Discharge  Physical Exam  Constitutional:       General: She is not in acute distress.     Appearance: She is not ill-appearing.   HENT:      Right Ear: External ear normal.      Left Ear: External ear normal.      Nose: Nose normal.      Mouth/Throat:      Mouth: Mucous membranes are moist.   Eyes:      Pupils: Pupils are equal, round, and reactive to light.   Pulmonary:      Effort: Pulmonary effort is normal.   Skin:     General: Skin is warm and dry.   Neurological:      General: No focal deficit present.   Psychiatric:         Mood and Affect: Mood normal.         Home Medications     Medication List      You have not been prescribed any medications.       Outpatient Follow-Up  Future Appointments   Date Time Provider Department [place]   12/11/2024  1:30 PM [id number]   12/12/2024  4:15 PM Kevin Bennett MD PhD [id number] [facility]   7/30/2025  3:00 PM [facility]   7/30/2025  3:30 PM [id number]       Dakota Amos MD

## 2024-11-08 NOTE — ANESTHESIA PROCEDURE NOTES
Airway  Date/Time: 11/8/2024 7:42 AM  Urgency: elective      Staffing  Performed: BILLY   Authorized by: Maik Myers MD    Performed by: BILLY Macias  Patient location during procedure: OR    Indications and Patient Condition  Indications for airway management: anesthesia  Spontaneous Ventilation: absent  Sedation level: deep  Preoxygenated: yes  Mask difficulty assessment: 1 - vent by mask    Final Airway Details  Final airway type: endotracheal airway      Successful airway: ETT  Cuffed: yes   Successful intubation technique: direct laryngoscopy  Facilitating devices/methods: intubating stylet  Blade: Bonilla  ETT size (mm): 5.0  Cormack-Lehane Classification: grade I - full view of glottis  Placement verified by: chest auscultation   Number of attempts at approach: 1    Additional Comments  5.0 Tenax ETT used PSR, balloons filled with saline per protocol

## 2024-11-08 NOTE — ANESTHESIA POSTPROCEDURE EVALUATION
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    Anesthesia Post Evaluation    Patient location during evaluation: bedside  Patient participation: complete - patient participated  Level of consciousness: awake  Pain management: adequate  Multimodal analgesia pain management approach  Airway patency: patent  Cardiovascular status: stable  Respiratory status: spontaneous ventilation and unassisted  Hydration status: acceptable  Postoperative Nausea and Vomiting: none  Comments: No significant PONV.        No notable events documented.

## 2024-11-08 NOTE — H&P
History Of Present Illness  Polina Porter is a 29 y.o. female presenting with a history of recurrent respiratory papilloma. She is scheduled for Microlaryngoscopy, Bronchoscopy; KTP Laser; Biopsy; Injection on 11/08/2024 in OR. Patient has a scar band on the right TVC extending to the anterior commissure where small papiloma exists and some papiloma in left ventrical. Procedure will help with her voice quality which has recently worsened. Patient has a worsening voice. No papilloma in the distal airway. Follow up in Spring 2025 for repeat evaluation after the surgery.    Past Medical History  Past Medical History:   Diagnosis Date    12 weeks gestation of pregnancy ([facility]) 12/17/2019    12 weeks gestation of pregnancy    20 weeks gestation of pregnancy ([facility]) 03/03/2020    20 weeks gestation of pregnancy    Encounter for screening for diabetes mellitus 10/26/2021    Screening for diabetes mellitus (DM)    Encounter for supervision of normal pregnancy, unspecified, unspecified trimester 07/27/2021    Prenatal care    Encounter for supervision of normal pregnancy, unspecified, unspecified trimester 04/09/2020    Prenatal care    Other conditions influencing health status 05/28/2021    History of pregnancy    Other diseases of vocal cords     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 She has never been exposed to tobacco smoke. She has never used smokeless tobacco. She reports that she does not drink alcohol and does not use drugs.    Family  History  No family history on file.     Allergies  Pollen extracts    Review of Systems   Constitutional:  Negative for fever.   Cardiovascular: Negative.  Negative for chest pain.   Genitourinary: Negative.    Skin: Negative.    Psychiatric/Behavioral: Negative.     All other systems reviewed and are negative.       Physical Exam  Constitutional:       General: She is not in acute distress.     Appearance: She is not ill-appearing.   HENT:      Right Ear: External ear normal.      Left Ear: External ear normal.      Nose: Nose normal.      Mouth/Throat:      Mouth: Mucous membranes are moist.   Eyes:      Pupils: Pupils are equal, round, and reactive to light.   Pulmonary:      Effort: Pulmonary effort is normal.   Skin:     General: Skin is warm and dry.   Neurological:      General: No focal deficit present.   Psychiatric:         Mood and Affect: Mood normal.        Assessment/Plan   Assessment & Plan  Papillomatosis of trachea      Patient and I discussed the risks, benefits and alternatives to surgery and all questions answered.  Cleared to OR.      Dakota Amos MD

## 2024-11-08 NOTE — DISCHARGE INSTRUCTIONS
".  Postoperative Care Instructions:  Microdirect Laryngoscopy/Bronchoscopy with Laser Surgery for Laryngeal Mass/Lesions    Postoperative Care:  For your surgery, special microscopic instruments were used and an operating telescope was placed in your mouth to look at your vocal cords and trachea to treat your airway. No external incisions made.      It is normal for your voice to be hoarse for several days or weeks after surgery while the healing process occurs.     Your surgeon may also ask you to perform \"Voice rest\" which means no speaking for the period of time requested. Once you are allowed to start talking, it is very important to use a “conservative” or “confidential voice” after surgery to allow your voice to recover.   This means that you are using your normal voice at a much lower volume than usual, without any straining, yelling, screaming, or singing.  This typically sounds like a soft or breathy whisper.  It is also important to avoid extended periods of voice use.   Do not speak to anyone who is farther than an arm's length away, as this would require you to raise your voice.      It is very important to avoid excessive coughing or throat clearing after surgery, as this will slow down the healing process.  If you have an urge to cough that you cannot control on your own with relaxation techniques, you can purchase an over-the-counter cough suppressant to use, but make sure it does not interact with your other medications.      Finally, make sure to drink plenty of water to stay well hydrated after surgery, as this will help to speed your recovery by keeping your secretions thin and your vocal cords moist.  Use of cough lozenges is also allowed.      Diet: You may resume your normal diet after surgery. You may want to start out with liquids and light meals or soft foods and advance to your usual diet as tolerated.     Our Nurse will contact you a few days after surgery to schedule your follow up " appointment, which is typically 3-6 weeks after surgery.  For any questions or concerns, please call the respective office between the hours of 9am-4pm.   Please call:  Dr. Amos's office @ [phone]  Dr. Becker's office @ [phone]   Dr. Pham's office @ [phone]  If issues arise over the weekend or after hours, please call our hospital  at [phone] and ask for the ENT resident on call.    What to Expect Following Surgery:    The following are some symptoms that may follow your recent surgery:    Pain - Some mild pain is normal after surgery.  As adequate pain control is necessary for healing, please take over-the-counter Tylenol or Motrin per the package directions as needed for pain.  Occasionally, a narcotic pain medication is prescribed for your use after surgery.  If this is the case, please take the medication only as directed.  You may need to take an over-the-counter stool softener as narcotic pain medications can cause constipation. Massage, cold packs, relaxation techniques, listening to music, and positive thinking will also help control your postoperative pain.    Taste disturbance and/or tongue numbness - Due to the surgical instruments used during surgery, you may experience tongue numbness and/or taste disturbance after surgery, but this is usually temporary.  It may take 1-4 weeks to completely resolve.  It is also normal for your tongue to feel swollen or bruised after surgery, and this will also resolve in a few days.    Bleeding - For a few days after surgery, it is normal to cough up some blood in your mucus.  However, if you experience continuous bright red bleeding from your mouth or if you are coughing up blood clots, please call your doctor's office immediately.  If you are on any blood thinning medications, such as Aspirin, Plavix, or Coumadin, you may restart them immediately after surgery unless you were specifically told not to do so by your surgeon.    Muscle  aches/soreness - You may experience generalized muscle aches and/or soreness after surgery, and this is a normal effect of anesthesia.  They should completely resolve after a few days.     Swelling/throat tightness - If you were given steroid medication, this can help with swelling and should be taken as directed. The side effects from steroid use is typically minimal when taken for short periods, as used in these cases. If you have questions, please discuss with your pharmacist.

## 2024-11-15 ENCOUNTER — TELEPHONE (OUTPATIENT)
Dept: SURGERY | Facility: CLINIC | Age: 29
End: 2024-11-15
Payer: COMMERCIAL

## 2024-11-15 NOTE — TELEPHONE ENCOUNTER
Spoke with patient and let her know that her appointment with Dr. Bennett needed to be rescheduled from 12/12/2024 to January 2025. The patient agreed. Documenting.

## 2024-12-11 ENCOUNTER — APPOINTMENT (OUTPATIENT)
Dept: OTOLARYNGOLOGY | Facility: HOSPITAL | Age: 29
End: 2024-12-11
Payer: COMMERCIAL

## 2024-12-12 ENCOUNTER — APPOINTMENT (OUTPATIENT)
Dept: SURGERY | Facility: CLINIC | Age: 29
End: 2024-12-12
Payer: COMMERCIAL

## 2025-01-02 ENCOUNTER — APPOINTMENT (OUTPATIENT)
Dept: SURGERY | Facility: CLINIC | Age: 30
End: 2025-01-02
Payer: COMMERCIAL

## 2025-01-02 VITALS
DIASTOLIC BLOOD PRESSURE: 63 MMHG | HEART RATE: 66 BPM | OXYGEN SATURATION: 94 % | BODY MASS INDEX: 25.03 KG/M2 | RESPIRATION RATE: 16 BRPM | TEMPERATURE: 97.9 F | HEIGHT: 62 IN | WEIGHT: 136 LBS | SYSTOLIC BLOOD PRESSURE: 106 MMHG

## 2025-01-02 DIAGNOSIS — K43.9 HERNIA OF ABDOMINAL WALL: ICD-10-CM

## 2025-01-02 DIAGNOSIS — K43.9 ABDOMINAL WALL HERNIA: ICD-10-CM

## 2025-01-02 DIAGNOSIS — Z71.9 ENCOUNTER FOR CONSULTATION: ICD-10-CM

## 2025-01-02 PROBLEM — J38.3: Status: ACTIVE | Noted: 2025-01-02

## 2025-01-02 PROBLEM — O14.90 PRE-ECLAMPSIA: Status: RESOLVED | Noted: 2025-01-02 | Resolved: 2025-01-02

## 2025-01-02 PROBLEM — R06.02 SOB (SHORTNESS OF BREATH) ON EXERTION: Status: RESOLVED | Noted: 2025-01-02 | Resolved: 2025-01-02

## 2025-01-02 PROBLEM — B97.7: Status: ACTIVE | Noted: 2025-01-02

## 2025-01-02 PROCEDURE — 1036F TOBACCO NON-USER: CPT | Performed by: SURGERY

## 2025-01-02 PROCEDURE — 99202 OFFICE O/P NEW SF 15 MIN: CPT | Performed by: SURGERY

## 2025-01-02 PROCEDURE — 3008F BODY MASS INDEX DOCD: CPT | Performed by: SURGERY

## 2025-01-02 ASSESSMENT — ENCOUNTER SYMPTOMS
ABDOMINAL PAIN: 1
CONSTITUTIONAL NEGATIVE: 1
EYES NEGATIVE: 1
RESPIRATORY NEGATIVE: 1
MUSCULOSKELETAL NEGATIVE: 1
CARDIOVASCULAR NEGATIVE: 1
ENDOCRINE NEGATIVE: 1

## 2025-01-02 ASSESSMENT — PAIN SCALES - GENERAL: PAINLEVEL_OUTOF10: 0-NO PAIN

## 2025-01-02 NOTE — PROGRESS NOTES
Subjective   Patient ID: Polina Porter is a 29 y.o. female who presents for Hernia (Abdominal wall).  HPI      28 YO F BMI 24 with HPV of her airway since she was a child. She developed bilateral lung cancers. In August 2016 showed underwent a right upper lobectomy and middle lobe lateral segmentectomy for a T1bN0 squamous cell cancer. In May 2017 showed underwent a left superior segmentectomy for aT1bN0 squamous cell cancer. She also has HPV irritation in her upper airway and is seen ENT for periodic endoscopies and ablations. Recurrent respiratory papilloma excised recently 11/08/2024.    Has been pregnant, developed abdominal wall hernia. Has a 3 cm epigastric nonreducible hernia that bothers her when she works out. Happened after first pregnancy but waited to get repaired. Is an assistant and sits at work. Has a 3, 5, and 7 year old at home.    Review of Systems   Constitutional: Negative.    HENT: Negative.     Eyes: Negative.    Respiratory: Negative.     Cardiovascular: Negative.    Gastrointestinal:  Positive for abdominal pain.   Endocrine: Negative.    Genitourinary: Negative.    Musculoskeletal: Negative.        Objective   Physical Exam  Constitutional:       Appearance: Normal appearance.   HENT:      Head: Atraumatic.      Nose: Nose normal.      Mouth/Throat:      Mouth: Mucous membranes are moist.   Cardiovascular:      Rate and Rhythm: Normal rate and regular rhythm.   Pulmonary:      Effort: Pulmonary effort is normal.      Breath sounds: Normal breath sounds.   Abdominal:      General: There is no distension.      Palpations: Abdomen is soft.      Tenderness: There is no guarding or rebound.      Comments: 3 cm nonreducible epigastric hernia without bowel.   Skin:     General: Skin is warm.   Neurological:      Mental Status: She is alert. Mental status is at baseline.   Psychiatric:         Mood and Affect: Mood normal.         Thought Content: Thought content normal.         Judgment: Judgment  normal.         Assessment/Plan   Problem List Items Addressed This Visit    None  Visit Diagnoses         Codes    Abdominal wall hernia     K43.9    Encounter for consultation     Z71.9    Hernia of abdominal wall     K43.9          Discussed risk and warning signs.  Will discuss timing with  and work.  Offered Monday or Friday with week or weekend off respectively.  Nell will call to schedule.  Will need consent.       Kevin Bennett MD PhD 01/02/25 4:06 PM

## 2025-01-03 ENCOUNTER — TELEPHONE (OUTPATIENT)
Dept: SURGERY | Facility: CLINIC | Age: 30
End: 2025-01-03
Payer: COMMERCIAL

## 2025-01-03 NOTE — TELEPHONE ENCOUNTER
Per Dr. Bennett's request I called and spoke with patient to see how she and her  wish to proceed regarding her hernia repair.  Patient verbalized that with her kids, they both feel that later in the year would be best.  I asked her since she was on the phone if she'd like to schedule that appointment and she verbalized yes.  She is scheduled for a preoperative visit September 4 at 3:30 with Dr. Bennett. All questions were addressed and answered.

## 2025-01-16 ENCOUNTER — APPOINTMENT (OUTPATIENT)
Dept: OBSTETRICS AND GYNECOLOGY | Facility: CLINIC | Age: 30
End: 2025-01-16
Payer: COMMERCIAL

## 2025-01-16 VITALS
BODY MASS INDEX: 24.48 KG/M2 | WEIGHT: 133 LBS | DIASTOLIC BLOOD PRESSURE: 60 MMHG | HEIGHT: 62 IN | SYSTOLIC BLOOD PRESSURE: 110 MMHG

## 2025-01-16 DIAGNOSIS — Z12.4 CERVICAL CANCER SCREENING: ICD-10-CM

## 2025-01-16 DIAGNOSIS — D22.9 SKIN MOLE: Primary | ICD-10-CM

## 2025-01-16 DIAGNOSIS — Z01.419 WELL WOMAN EXAM: ICD-10-CM

## 2025-01-16 PROCEDURE — 87591 N.GONORRHOEAE DNA AMP PROB: CPT

## 2025-01-16 PROCEDURE — 99385 PREV VISIT NEW AGE 18-39: CPT | Performed by: ADVANCED PRACTICE MIDWIFE

## 2025-01-16 PROCEDURE — 1036F TOBACCO NON-USER: CPT | Performed by: ADVANCED PRACTICE MIDWIFE

## 2025-01-16 PROCEDURE — 3008F BODY MASS INDEX DOCD: CPT | Performed by: ADVANCED PRACTICE MIDWIFE

## 2025-01-16 PROCEDURE — 87491 CHLMYD TRACH DNA AMP PROBE: CPT

## 2025-01-16 RX ORDER — COPPER 313.4 MG/1
1 INTRAUTERINE DEVICE INTRAUTERINE ONCE
COMMUNITY

## 2025-01-16 ASSESSMENT — PAIN SCALES - GENERAL: PAINLEVEL_OUTOF10: 0-NO PAIN

## 2025-01-16 NOTE — PROGRESS NOTES
"Assessment/Plan   Problem List Items Addressed This Visit             ICD-10-CM       Medium    Well woman exam Z01.419     Pap done   IUD good x 7 years           Other Visit Diagnoses         Codes    Skin mole    -  Primary D22.9    Relevant Orders    Referral to Dermatology            MALORIE Youssef-NATE     Subjective   Polina Porter is a 29 y.o. female who is here for a routine exam.     Lives with:  and 3 kids   Current employment: [employer]-   T/E/D: never/no ETOH or drug use   Up to date vision/dental: needs to update   PCP: Dr. Arango   Menstrual history: Paragard IUD, regular cycles   Sexual history: monogamous male partner x 12 years   Pregnancy history:  G/P 3/3  T: 3 P:  EAB:   Ectopic:   SAB:   L:  3    Diet: healthy   Exercise: videos at home     PMH, PSH, and Family hx reviewed and updated    Current contraception: IUD-Paragard inserted 03/2022  Refill Y/N:    Last pap: 2019  History of abnormal Pap smear: no  Family history of breast, uterine,  ovarian cancer: no  Regular self breast exam: no  Last mammogram: n/a until age 42       Review of Systems    Objective   /60   Ht 1.575 m (5' 2\")   Wt 60.3 kg (133 lb)   LMP 01/07/2025   BMI 24.33 kg/m²     Physical Exam  Constitutional:       Appearance: Normal appearance.   HENT:      Head: Normocephalic.   Neck:      Thyroid: No thyroid mass, thyromegaly or thyroid tenderness.   Cardiovascular:      Rate and Rhythm: Normal rate and regular rhythm.   Pulmonary:      Effort: Pulmonary effort is normal.      Breath sounds: Normal breath sounds.   Chest:   Breasts:     Right: Normal. No mass, nipple discharge or tenderness.      Left: Normal. No mass, nipple discharge or tenderness.   Abdominal:      Palpations: Abdomen is soft.   Genitourinary:     General: Normal vulva.      Vagina: Normal.      Cervix: Normal.      Comments: IUD strings visible   Musculoskeletal:         General: Normal range of motion. "   Lymphadenopathy:      Upper Body:      Right upper body: No axillary adenopathy.      Left upper body: No axillary adenopathy.   Skin:     General: Skin is warm and dry.   Neurological:      Mental Status: She is alert and oriented to person, place, and time.   Psychiatric:         Mood and Affect: Mood normal.

## 2025-01-20 LAB
C TRACH RRNA SPEC QL NAA+PROBE: NEGATIVE
N GONORRHOEA DNA SPEC QL PROBE+SIG AMP: NEGATIVE

## 2025-01-31 LAB
CYTOLOGY CMNT CVX/VAG CYTO-IMP: NORMAL
LAB AP HPV GENOTYPE QUESTION: NO
LAB AP HPV HR: NORMAL
LAB AP PAP ADDITIONAL TESTS: NORMAL
LABORATORY COMMENT REPORT: NORMAL
LMP START DATE: NORMAL
PATH REPORT.TOTAL CANCER: NORMAL

## 2025-03-06 ENCOUNTER — APPOINTMENT (OUTPATIENT)
Dept: DERMATOLOGY | Facility: CLINIC | Age: 30
End: 2025-03-06
Payer: COMMERCIAL

## 2025-03-06 DIAGNOSIS — D22.9 MELANOCYTIC NEVUS, UNSPECIFIED LOCATION: ICD-10-CM

## 2025-03-06 DIAGNOSIS — D23.9 DERMATOFIBROMA: Primary | ICD-10-CM

## 2025-03-06 PROCEDURE — 99203 OFFICE O/P NEW LOW 30 MIN: CPT | Performed by: STUDENT IN AN ORGANIZED HEALTH CARE EDUCATION/TRAINING PROGRAM

## 2025-03-06 NOTE — PROGRESS NOTES
Subjective     Polina Porter is a 29 y.o. female who presents for the following: Suspicious Skin Lesion (Patient would like moles on thighs examined today. She first noticed them 2 years ago.).     Review of Systems:  No other skin or systemic complaints other than what is documented elsewhere in the note.    The following portions of the chart were reviewed this encounter and updated as appropriate:          Skin Cancer History  No skin cancer on file.      Specialty Problems    None       Objective   Well appearing patient in no apparent distress; mood and affect are within normal limits.    A focused skin examination was performed. All findings within normal limits unless otherwise noted below.    Assessment/Plan   1. Dermatofibroma (3)  Left Medial Thigh, Left Thigh - Anterior, Left Thigh - Posterior  Firm pink-brown papule that dimples with lateral pressure.    Discussed benign nature of condition, reassured. Reviewed warning signs of skin cancer with patient.    2. Melanocytic nevus, unspecified location  Right Lower Leg - Anterior  Benign to slightly atypical appearing brown pigmented macules and papules    Clinically benign appearing nevi, reassurance provided to the patient today. Discussed important of sun protection with sun protective clothing and/or broad spectrum sunscreen spf 30 or above.  ABCDEs of melanoma reviewed. Patient to f/u should they notice any new or changing pre-existing skin lesion.

## 2025-05-06 NOTE — PROGRESS NOTES
"AUDIOLOGY ADULT AUDIOMETRIC EVALUATION      Name:  Polina Porter   :  1995  Age:  30 y.o.  Date of Evaluation:  2025    Time: ***-***    IMPRESSIONS     {Adult Impressions:77688}  {impressions wrs:56019}  {oaes (Optional):45730}  {tymps:35632}     Today's test results are {AUD IMPRESSIONS:44817}.     Amplification needs: {Amplification Needs:31651}    RECOMMENDATIONS     {Adult Recommendations:94810}    HISTORY     Reason for visit:  Ms. Porter is seen today for an initial audiologic evaluation in conjunction with an evaluation with Dakota Amos MD; See same day encounter in the Ears Nose and Throat (ENT) department for additional information. She has history of vocal cord issues and surgery with Dr. Amos on 24. History obtained from patient report and chart review.     Change in Hearing: {derrick y/n:75351}  Difficult listening environments: ***  Tinnitus: {derrick y/n:70712} {derrick tinnitus (Optional):23615}  Otalgia: {derrick y/n:05072::\"denied\"}  Aural Pressure/Fullness: {derrick y/n:14352::\"denied\"}  Recent Ear Infections/Illness: {derrick y/n:91174::\"denied\"}  Otorrhea: {derrick y/n:38059::\"denied\"}  Dizziness: {derrick y/n dizzy:96704::\"denied\"}  History of Ear Surgeries: {y/n surgeries:18337::\"denied\"}  History of Noise Exposure: {derrick noise exposure:22612::\"Denied\"}  Family History of Hearing Loss: {derrick fam hx hl:81475::\"Denied\"}  Hearing Aid Use: {derrick HA use:38198::\"none\"}  Other Significant History: {derrick y/n:37280::\"denied\"}  Falls within the last year: {derrick y/n falls:28829::\"denied\"}    EVALUATION     See scanned audiogram in \"Media\"     TEST RESULTS     Otoscopic Evaluation:  Right Ear: {otoscopy:76348}  Left Ear: {otoscopy:81605}    Tympanometry (226 Hz):  Right Ear: {tymp results:64602}.   Left Ear: {tymp results:75120}.     Acoustic Reflexes:   Right Ear: {derrick ART:11908}  Left Ear: {derrick ART:61229}    Distortion Product Otoacoustic Emissions:  Right Ear: {derrick DPOAEs:36115::\"Did not test.\"}  Left Ear:  {derrick DPOAEs:65387::\"Did not " "test.\"}  Present OAEs suggest normal or near cochlear outer hair cell function for corresponding frequency region(s). Absent OAEs with normal middle ear function can be consistent with some degree of hearing loss. Assessment of cochlear outer hair cell function may be impacted by outer or middle ear function.    Test technique:  Pure Tone Audiometry via {transducer:44241}  Reliability: {kmreliability:40785}    Pure Tone Audiometry:    Right Ear: {results:71838}  Left Ear: {results:33762}    Speech Audiometry:   Right Ear:  Speech Reception Threshold (SRT) was obtained at *** dB HL. Word Recognition scores were {DESC; EXCELLENT/GOOD/FAIR:68871} (***%) in quiet when words were presented at *** dB HL. These results are based on {Word List:47179}.   Left Ear:  Speech Reception Threshold (SRT) was obtained at *** dB HL. Word Recognition scores were {DESC; EXCELLENT/GOOD/FAIR:81745} (***%) in quiet when words were presented at *** dB HL. These results are based on {Word List:17663}.     Comparison of today's results with previous test results: No previous results available.    ***     PATIENT EDUCATION     Discussed results and recommendations with Ms. Porter{derrick results discussion (Optional):10238}. Questions were addressed and the patient was encouraged to contact our department at [phone] should concerns arise.       Fabián Landaverde, CCC-A  Senior Clinical Audiologist      Degree of   Hearing Sensitivity dB Range   Within Normal Limits (WNL) 0 - 20   Slight 25   Mild 26 - 40   Moderate 41 - 55   Moderately-Severe 56 - 70   Severe 71 - 90   Profound 91 +     Key   CHL Conductive Hearing Loss   ECV Ear Canal Volume   HA Hearing Aid   NIHL Noise-Induced Hearing Loss   PTA Pure Tone Average   SNHL Sensorineural Hearing Loss   TM Tympanic Membrane   WNL Within Normal Limits     "

## 2025-05-12 ENCOUNTER — APPOINTMENT (OUTPATIENT)
Dept: OTOLARYNGOLOGY | Facility: HOSPITAL | Age: 30
End: 2025-05-12
Payer: COMMERCIAL

## 2025-05-12 ENCOUNTER — APPOINTMENT (OUTPATIENT)
Dept: AUDIOLOGY | Facility: HOSPITAL | Age: 30
End: 2025-05-12
Payer: COMMERCIAL

## 2025-06-23 ENCOUNTER — APPOINTMENT (OUTPATIENT)
Dept: OTOLARYNGOLOGY | Facility: HOSPITAL | Age: 30
End: 2025-06-23
Payer: COMMERCIAL

## 2025-07-16 ENCOUNTER — TELEPHONE (OUTPATIENT)
Dept: OBSTETRICS AND GYNECOLOGY | Facility: CLINIC | Age: 30
End: 2025-07-16
Payer: COMMERCIAL

## 2025-07-16 NOTE — TELEPHONE ENCOUNTER
Pt contacted.   Monthly cycles past 6 months.  LMP 6/6/25.   No period this month yet.  Copper iud since 2022.  -UPT on Monday.  C/o bloating and some spotting.  Pt instructed to wait one week to repeat UPT.  Pt prefers to repeat one today.  Pt advised she can..  if still negative and still no menses,  pt will wait a week before doing another test.

## 2025-07-28 NOTE — PROGRESS NOTES
Subjective   Polina Porter  is a 30 y.o. 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 She developed bilateral lung cancers. In August 2016 showed underwent a right upper lobectomy and middle lobe lateral segmentectomy for a T1bN0 squamous cell cancer. In May 2017 showed underwent a left superior segmentectomy for aT1bN0 squamous cell cancer. She also has HPV irritation in her upper airway and is seen ENT for periodic endoscopies and ablations. She is otherwise well and has no symptoms    Currently the patient is presenting for routine follow-up and in their usual state of health. She denies the following symptoms: chest pain, shortness of breath at rest, shortness of breath with activity, cough, hemoptysis, fevers, chills, and weight loss.      There have been no significant changes to their documented medical, surgical and family history.     11/8/2024 laryngoscopy with ablation with Dr Dakota Amos    She  reports that she has never smoked. She has never been exposed to tobacco smoke. She has never used smokeless tobacco. She reports that she does not drink alcohol and does not use drugs.    Objective   Physical Exam  The patient is well-appearing and in no acute distress. The trachea is midline and there is no crepitus. The lungs were clear to auscultation grossly. There was good effort and excursion. The heart had a regular rate and rhythm. The abdomen was soft, nontender and nondistended. The extremities had no edema or gross deformities. Mood and affect are appropriate.    /62 (BP Location: Right arm, Patient Position: Sitting, BP Cuff Size: Adult)   Pulse 68   Temp 36.3 °C (97.3 °F) (Temporal)   Wt 55.7 kg (122 lb 14.4 oz)   SpO2 98%   BMI 22.48 kg/m²      Diagnostic Studies  I reviewed a CT chest performed recently. I do not see any new or concerning nodules or adenopathy  Final report pending       Assessment/Plan   I  believe that the patient has no evidence of cancer recurrence.     Based on the patient's clinical presentation and my review of their radiographic imaging, I believe they have no evidence of cancer recurrence.  I recommend ongoing radiographic screening.    I recommend CT chest in 12 months    I discussed this in detail with the patient, including a discussion of alternatives. They were comfortable with this approach.       Tamica Contreras PA-C  Department of Thoracic and Esophageal Surgery  [facility]  [phone]       === 07/30/25 ===    CT CHEST WO IV CONTRAST    - Impression -  1. Stable postsurgical changes of right upper lobectomy, right middle  lobe segmentectomy, and left lower lobar segmentectomy without  definite evidence of locoregional or metastatic recurrence within the  chest.  2. Slight progressive increase in size of a lobulated pleural-based  nodule in the anterior right lower lobe over multiple remote prior  examinations dating back to 2019. Given the extremely slow growth,  this is felt to be benign in nature, although continued attention on  follow-up imaging is recommended.    I personally reviewed the images/study and I agree with the findings  as stated by resident physician Barak Birch MD. This study was  interpreted at [facility],  [place], [place].    MACRO:  None    Signed by: Marty Dyer 7/30/2025 4:38 PM  Dictation workstation:   [id number]

## 2025-07-30 ENCOUNTER — APPOINTMENT (OUTPATIENT)
Dept: RADIOLOGY | Facility: HOSPITAL | Age: 30
End: 2025-07-30
Payer: COMMERCIAL

## 2025-07-30 ENCOUNTER — HOSPITAL ENCOUNTER (OUTPATIENT)
Dept: RADIOLOGY | Facility: HOSPITAL | Age: 30
Discharge: HOME | End: 2025-07-30
Payer: COMMERCIAL

## 2025-07-30 ENCOUNTER — OFFICE VISIT (OUTPATIENT)
Dept: SURGERY | Facility: HOSPITAL | Age: 30
End: 2025-07-30
Payer: COMMERCIAL

## 2025-07-30 VITALS
DIASTOLIC BLOOD PRESSURE: 62 MMHG | SYSTOLIC BLOOD PRESSURE: 110 MMHG | WEIGHT: 122.9 LBS | HEART RATE: 68 BPM | TEMPERATURE: 97.3 F | BODY MASS INDEX: 22.48 KG/M2 | OXYGEN SATURATION: 98 %

## 2025-07-30 DIAGNOSIS — C34.90 MALIGNANT NEOPLASM OF LUNG, UNSPECIFIED LATERALITY, UNSPECIFIED PART OF LUNG (MULTI): Primary | ICD-10-CM

## 2025-07-30 DIAGNOSIS — C34.90 MALIGNANT NEOPLASM OF LUNG, UNSPECIFIED LATERALITY, UNSPECIFIED PART OF LUNG (MULTI): ICD-10-CM

## 2025-07-30 PROCEDURE — 71250 CT THORAX DX C-: CPT

## 2025-07-30 PROCEDURE — 99214 OFFICE O/P EST MOD 30 MIN: CPT | Mod: 25 | Performed by: PHYSICIAN ASSISTANT

## 2025-07-30 PROCEDURE — 71250 CT THORAX DX C-: CPT | Performed by: STUDENT IN AN ORGANIZED HEALTH CARE EDUCATION/TRAINING PROGRAM

## 2025-07-30 PROCEDURE — 99214 OFFICE O/P EST MOD 30 MIN: CPT | Performed by: PHYSICIAN ASSISTANT

## 2025-07-30 ASSESSMENT — PAIN SCALES - GENERAL: PAINLEVEL_OUTOF10: 0-NO PAIN

## 2025-09-04 ENCOUNTER — APPOINTMENT (OUTPATIENT)
Dept: SURGERY | Facility: CLINIC | Age: 30
End: 2025-09-04
Payer: COMMERCIAL

## 2026-01-16 ENCOUNTER — APPOINTMENT (OUTPATIENT)
Dept: OBSTETRICS AND GYNECOLOGY | Facility: CLINIC | Age: 31
End: 2026-01-16
Payer: COMMERCIAL

## (undated) DEVICE — COVER, BACK TABLE, 65 X 90, HVY REINFORCED

## (undated) DEVICE — SYRINGE, HYPODERMIC, TB, W/O NEEDLE, 1 CC, SLIP TIP

## (undated) DEVICE — DENTITION PROTECTOR, QUICKGUARD, NON-PERF

## (undated) DEVICE — SYRINGE, 1 CC, LUER LOCK

## (undated) DEVICE — TUBING, SUCTION, CONNECTING, STERILE 0.25 X 120 IN., LF

## (undated) DEVICE — SPONGE, NEURO, 1/2 X 3IN, STERILE, LF

## (undated) DEVICE — DRESSING, NON-ADHERENT, TELFA, OUCHLESS, 3 X 8 IN, STERILE

## (undated) DEVICE — Device

## (undated) DEVICE — PAD, EYE, OVAL, 1 5/8 X 2 5/8 IN, STERILE

## (undated) DEVICE — NEEDLE, INJECTION, OROTRACHEAL

## (undated) DEVICE — SYRINGE, 50 CC, LUER LOCK

## (undated) DEVICE — MARKER, SKIN, DUAL TIP INK W/9 LABEL AND REMOVABLE TIME OUT SLEEVE

## (undated) DEVICE — TUBING, SUCTION, NON-CONDUCTIVE, W/CONNECT,.25 IN X 12 FT, STERILE, LF

## (undated) DEVICE — CONTAINER, SPECIMEN, 120 ML, STERILE

## (undated) DEVICE — SOLUTION, IRRIGATION, SODIUM CHLORIDE 0.9%, 1000 ML, POUR BOTTLE